# Patient Record
Sex: FEMALE | Race: WHITE | NOT HISPANIC OR LATINO | Employment: FULL TIME | URBAN - METROPOLITAN AREA
[De-identification: names, ages, dates, MRNs, and addresses within clinical notes are randomized per-mention and may not be internally consistent; named-entity substitution may affect disease eponyms.]

---

## 2018-07-24 PROBLEM — L50.1 IDIOPATHIC URTICARIA: Status: ACTIVE | Noted: 2018-07-24

## 2019-09-19 ENCOUNTER — TELEPHONE (OUTPATIENT)
Dept: NEUROLOGY | Facility: HOSPITAL | Age: 44
End: 2019-09-19

## 2019-09-19 NOTE — TELEPHONE ENCOUNTER
----- Message from Stacy Silvestre sent at 9/11/2019  4:02 PM CDT -----  Regarding: FW: New Patient  Contact: [phone]      ----- Message -----  From: Jeanette Chin  Sent: 9/10/2019  12:11 PM  To: [facility]  Subject: New Patient                                      New Patient:  Neida Allen    Referred By: Self Referral    Why do you need to be seen?  Problems swallowing certain foods    Which doctor are you requesting? Dr. Sullivan    You may contact the patient back to schedule at:  [phone]